# Patient Record
Sex: FEMALE | Race: WHITE
[De-identification: names, ages, dates, MRNs, and addresses within clinical notes are randomized per-mention and may not be internally consistent; named-entity substitution may affect disease eponyms.]

---

## 2021-04-07 ENCOUNTER — HOSPITAL ENCOUNTER (OUTPATIENT)
Dept: HOSPITAL 76 - DI | Age: 51
Discharge: HOME | End: 2021-04-07
Attending: NURSE PRACTITIONER
Payer: COMMERCIAL

## 2021-04-07 DIAGNOSIS — Z12.31: Primary | ICD-10-CM

## 2022-09-09 ENCOUNTER — HOSPITAL ENCOUNTER (OUTPATIENT)
Dept: HOSPITAL 76 - LAB | Age: 52
Discharge: HOME | End: 2022-09-09
Attending: NURSE PRACTITIONER
Payer: COMMERCIAL

## 2022-09-09 DIAGNOSIS — Z13.0: ICD-10-CM

## 2022-09-09 DIAGNOSIS — Z13.29: ICD-10-CM

## 2022-09-09 DIAGNOSIS — Z13.220: ICD-10-CM

## 2022-09-09 DIAGNOSIS — Z13.228: Primary | ICD-10-CM

## 2022-09-09 LAB
ALBUMIN DIAFP-MCNC: 4.6 G/DL (ref 3.2–5.5)
ALBUMIN/GLOB SERPL: 1.6 {RATIO} (ref 1–2.2)
ALP SERPL-CCNC: 64 IU/L (ref 42–121)
ALT SERPL W P-5'-P-CCNC: 24 IU/L (ref 10–60)
ANION GAP SERPL CALCULATED.4IONS-SCNC: 8 MMOL/L (ref 6–13)
AST SERPL W P-5'-P-CCNC: 24 IU/L (ref 10–42)
BASOPHILS NFR BLD AUTO: 0.1 10^3/UL (ref 0–0.1)
BASOPHILS NFR BLD AUTO: 1.4 %
BILIRUB BLD-MCNC: 0.8 MG/DL (ref 0.2–1)
BUN SERPL-MCNC: 11 MG/DL (ref 6–20)
CALCIUM UR-MCNC: 9.9 MG/DL (ref 8.5–10.3)
CHLORIDE SERPL-SCNC: 104 MMOL/L (ref 101–111)
CHOLEST SERPL-MCNC: 206 MG/DL
CO2 SERPL-SCNC: 29 MMOL/L (ref 21–32)
CREAT SERPLBLD-SCNC: 0.7 MG/DL (ref 0.4–1)
EOSINOPHIL # BLD AUTO: 0.7 10^3/UL (ref 0–0.7)
EOSINOPHIL NFR BLD AUTO: 11.8 %
ERYTHROCYTE [DISTWIDTH] IN BLOOD BY AUTOMATED COUNT: 12.7 % (ref 12–15)
GFRSERPLBLD MDRD-ARVRAT: 88 ML/MIN/{1.73_M2} (ref 89–?)
GLOBULIN SER-MCNC: 2.8 G/DL (ref 2.1–4.2)
GLUCOSE SERPL-MCNC: 104 MG/DL (ref 70–100)
HCT VFR BLD AUTO: 45.1 % (ref 37–47)
HDLC SERPL-MCNC: 74 MG/DL
HDLC SERPL: 2.8 {RATIO} (ref ?–4.4)
HGB UR QL STRIP: 15.2 G/DL (ref 12–16)
LDLC SERPL CALC-MCNC: 114 MG/DL
LDLC/HDLC SERPL: 1.5 {RATIO} (ref ?–4.4)
LYMPHOCYTES # SPEC AUTO: 1.6 10^3/UL (ref 1.5–3.5)
LYMPHOCYTES NFR BLD AUTO: 29.3 %
MCH RBC QN AUTO: 29.5 PG (ref 27–31)
MCHC RBC AUTO-ENTMCNC: 33.7 G/DL (ref 32–36)
MCV RBC AUTO: 87.6 FL (ref 81–99)
MONOCYTES # BLD AUTO: 0.5 10^3/UL (ref 0–1)
MONOCYTES NFR BLD AUTO: 9.6 %
NEUTROPHILS # BLD AUTO: 2.6 10^3/UL (ref 1.5–6.6)
NEUTROPHILS # SNV AUTO: 5.5 X10^3/UL (ref 4.8–10.8)
NEUTROPHILS NFR BLD AUTO: 47.7 %
NRBC # BLD AUTO: 0 /100WBC
NRBC # BLD AUTO: 0 X10^3/UL
PDW BLD AUTO: 9.3 FL (ref 7.9–10.8)
PLATELET # BLD: 309 10^3/UL (ref 130–450)
POTASSIUM SERPL-SCNC: 4 MMOL/L (ref 3.5–5)
PROT SPEC-MCNC: 7.4 G/DL (ref 6.7–8.2)
RBC MAR: 5.15 10^6/UL (ref 4.2–5.4)
SODIUM SERPLBLD-SCNC: 141 MMOL/L (ref 135–145)
TRIGL P FAST SERPL-MCNC: 91 MG/DL
TSH SERPL-ACNC: 2.26 UIU/ML (ref 0.34–5.6)
VLDLC SERPL-SCNC: 18 MG/DL

## 2022-09-09 PROCEDURE — 84443 ASSAY THYROID STIM HORMONE: CPT

## 2022-09-09 PROCEDURE — 80053 COMPREHEN METABOLIC PANEL: CPT

## 2022-09-09 PROCEDURE — 80061 LIPID PANEL: CPT

## 2022-09-09 PROCEDURE — 36415 COLL VENOUS BLD VENIPUNCTURE: CPT

## 2022-09-09 PROCEDURE — 83721 ASSAY OF BLOOD LIPOPROTEIN: CPT

## 2022-09-09 PROCEDURE — 85025 COMPLETE CBC W/AUTO DIFF WBC: CPT

## 2024-01-02 ENCOUNTER — HOSPITAL ENCOUNTER (OUTPATIENT)
Dept: HOSPITAL 76 - DI | Age: 54
Discharge: HOME | End: 2024-01-02
Attending: GENERAL ACUTE CARE HOSPITAL
Payer: COMMERCIAL

## 2024-01-02 DIAGNOSIS — Z12.31: Primary | ICD-10-CM

## 2024-01-02 DIAGNOSIS — Z13.228: ICD-10-CM

## 2024-01-02 DIAGNOSIS — R92.8: ICD-10-CM

## 2024-01-02 DIAGNOSIS — Z13.29: ICD-10-CM

## 2024-01-02 DIAGNOSIS — R92.323: ICD-10-CM

## 2024-01-02 DIAGNOSIS — Z13.0: ICD-10-CM

## 2024-01-02 LAB
ALBUMIN DIAFP-MCNC: 4.8 G/DL (ref 3.2–5.5)
ALBUMIN/GLOB SERPL: 1.6 {RATIO} (ref 1–2.2)
ALP SERPL-CCNC: 72 IU/L (ref 42–121)
ALT SERPL W P-5'-P-CCNC: 24 IU/L (ref 10–60)
ANION GAP SERPL CALCULATED.4IONS-SCNC: 6 MMOL/L (ref 6–13)
AST SERPL W P-5'-P-CCNC: 20 IU/L (ref 10–42)
BASOPHILS NFR BLD AUTO: 0.1 10^3/UL (ref 0–0.1)
BASOPHILS NFR BLD AUTO: 1.8 %
BILIRUB BLD-MCNC: 0.3 MG/DL (ref 0.2–1)
BUN SERPL-MCNC: 11 MG/DL (ref 6–20)
CALCIUM UR-MCNC: 9.8 MG/DL (ref 8.5–10.3)
CHLORIDE SERPL-SCNC: 100 MMOL/L (ref 101–111)
CO2 SERPL-SCNC: 31 MMOL/L (ref 21–32)
CREAT SERPLBLD-SCNC: 0.7 MG/DL (ref 0.6–1.3)
EOSINOPHIL # BLD AUTO: 0.4 10^3/UL (ref 0–0.7)
EOSINOPHIL NFR BLD AUTO: 6.5 %
ERYTHROCYTE [DISTWIDTH] IN BLOOD BY AUTOMATED COUNT: 12.9 % (ref 12–15)
GFRSERPLBLD MDRD-ARVRAT: 88 ML/MIN/{1.73_M2} (ref 89–?)
GLOBULIN SER-MCNC: 3 G/DL (ref 2.1–4.2)
GLUCOSE SERPL-MCNC: 99 MG/DL (ref 74–104)
HCT VFR BLD AUTO: 46.7 % (ref 37–47)
HGB UR QL STRIP: 15.2 G/DL (ref 12–16)
LYMPHOCYTES # SPEC AUTO: 1.7 10^3/UL (ref 1.5–3.5)
LYMPHOCYTES NFR BLD AUTO: 30.6 %
MCH RBC QN AUTO: 28.5 PG (ref 27–31)
MCHC RBC AUTO-ENTMCNC: 32.5 G/DL (ref 32–36)
MCV RBC AUTO: 87.5 FL (ref 81–99)
MONOCYTES # BLD AUTO: 0.5 10^3/UL (ref 0–1)
MONOCYTES NFR BLD AUTO: 8.3 %
NEUTROPHILS # BLD AUTO: 2.9 10^3/UL (ref 1.5–6.6)
NEUTROPHILS # SNV AUTO: 5.6 X10^3/UL (ref 4.8–10.8)
NEUTROPHILS NFR BLD AUTO: 52.6 %
NRBC # BLD AUTO: 0 /100WBC
NRBC # BLD AUTO: 0 X10^3/UL
PDW BLD AUTO: 9.2 FL (ref 7.9–10.8)
PLATELET # BLD: 372 10^3/UL (ref 130–450)
POTASSIUM SERPL-SCNC: 3.6 MMOL/L (ref 3.5–4.5)
PROT SPEC-MCNC: 7.8 G/DL (ref 6.4–8.9)
RBC MAR: 5.34 10^6/UL (ref 4.2–5.4)
SODIUM SERPLBLD-SCNC: 137 MMOL/L (ref 135–145)
TSH SERPL-ACNC: 0.92 UIU/ML (ref 0.34–5.6)

## 2024-01-02 PROCEDURE — 85025 COMPLETE CBC W/AUTO DIFF WBC: CPT

## 2024-01-02 PROCEDURE — 84443 ASSAY THYROID STIM HORMONE: CPT

## 2024-01-02 PROCEDURE — 80053 COMPREHEN METABOLIC PANEL: CPT

## 2024-01-02 PROCEDURE — 36415 COLL VENOUS BLD VENIPUNCTURE: CPT

## 2024-01-03 NOTE — MAMMOGRAPHY REPORT
BILATERAL DIGITAL SCREENING MAMMOGRAM 3D/2D: 1/2/2024

CLINICAL: Routine screening.  

 

Comparison is made to exam dated:  4/7/2021 mammogram - [facility].  

 

There are scattered areas of fibroglandular density in both breasts (category b / 25%-50% glandular t
issue).  

 

There is an equal density focal asymmetry in the right breast at 10 o'clock posterior depth.  

No other significant masses, calcifications, or other findings are seen in either breast.  

 

IMPRESSION: INCOMPLETE: NEEDS ADDITIONAL IMAGING EVALUATION

The equal density focal asymmetry in the right breast is indeterminate.  Additional views with possib
le ultrasound are recommended.  

 

 

Based on the Tyrer Cuzick model (a risk assessment model) the patients lifetime risk is 10.0% and he
r 10 year risk is 2.7%. According to the ACR, ACS, and NCCN guidelines, an annual breast MRI exam lisa
ng with mammogram is recommended if the patients lifetime risk is 20% or greater.

 

 

This exam was interpreted at Station ID: [id number].  

 

NOTE: For mammograms, a report in lay terms will be sent to the patient. Approximately 15% of breast 
malignancies will not be visualized mammographically. In the management of a palpable breast mass, a 
negative mammogram must not discourage biopsy of a clinically suspicious lesion.

 

Electronically Signed By: Kira Kumar M.D.          

lk/:1/3/2024 08:39:28  

 

 

 

ACR BI-RADS Category 0: Incomplete 3340F

PARENCHYMAL PATTERN: (A) - The breast(s) demonstrate(s) scattered fibroglandular densities.

BI-RADS CATEGORY: (0) - 0

Mammo and US

[id number]

Immediate follow-up

LATERALITY: (B)